# Patient Record
Sex: MALE | Race: WHITE | URBAN - METROPOLITAN AREA
[De-identification: names, ages, dates, MRNs, and addresses within clinical notes are randomized per-mention and may not be internally consistent; named-entity substitution may affect disease eponyms.]

---

## 2017-03-28 ENCOUNTER — TELEPHONE (OUTPATIENT)
Dept: PEDIATRICS | Facility: CLINIC | Age: 42
End: 2017-03-28

## 2017-03-28 NOTE — TELEPHONE ENCOUNTER
3/28/2017     Call Regarding ReattributionPhysical  Attempt 1     Message on voicemail   Comments:         Outreach   CHON

## 2017-04-27 ENCOUNTER — TELEPHONE (OUTPATIENT)
Dept: PEDIATRICS | Facility: CLINIC | Age: 42
End: 2017-04-27

## 2017-04-27 ENCOUNTER — OFFICE VISIT (OUTPATIENT)
Dept: PEDIATRICS | Facility: CLINIC | Age: 42
End: 2017-04-27
Payer: COMMERCIAL

## 2017-04-27 VITALS
TEMPERATURE: 97.4 F | DIASTOLIC BLOOD PRESSURE: 60 MMHG | SYSTOLIC BLOOD PRESSURE: 120 MMHG | HEART RATE: 74 BPM | BODY MASS INDEX: 29.4 KG/M2 | OXYGEN SATURATION: 97 % | HEIGHT: 71 IN | WEIGHT: 210 LBS

## 2017-04-27 DIAGNOSIS — Z00.00 ENCOUNTER FOR ROUTINE ADULT HEALTH EXAMINATION WITHOUT ABNORMAL FINDINGS: Primary | ICD-10-CM

## 2017-04-27 DIAGNOSIS — Z13.1 SCREENING FOR DIABETES MELLITUS: ICD-10-CM

## 2017-04-27 DIAGNOSIS — Z13.6 CARDIOVASCULAR SCREENING; LDL GOAL LESS THAN 160: ICD-10-CM

## 2017-04-27 DIAGNOSIS — R73.01 ELEVATED FASTING GLUCOSE: ICD-10-CM

## 2017-04-27 LAB
ANION GAP SERPL CALCULATED.3IONS-SCNC: 6 MMOL/L (ref 3–14)
BUN SERPL-MCNC: 14 MG/DL (ref 7–30)
CALCIUM SERPL-MCNC: 9 MG/DL (ref 8.5–10.1)
CHLORIDE SERPL-SCNC: 105 MMOL/L (ref 94–109)
CHOLEST SERPL-MCNC: 235 MG/DL
CO2 SERPL-SCNC: 29 MMOL/L (ref 20–32)
CREAT SERPL-MCNC: 1.08 MG/DL (ref 0.66–1.25)
GFR SERPL CREATININE-BSD FRML MDRD: 74 ML/MIN/1.7M2
GLUCOSE SERPL-MCNC: 145 MG/DL (ref 70–99)
HDLC SERPL-MCNC: 41 MG/DL
LDLC SERPL CALC-MCNC: 118 MG/DL
NONHDLC SERPL-MCNC: 194 MG/DL
POTASSIUM SERPL-SCNC: 4.2 MMOL/L (ref 3.4–5.3)
SODIUM SERPL-SCNC: 140 MMOL/L (ref 133–144)
TRIGL SERPL-MCNC: 381 MG/DL

## 2017-04-27 PROCEDURE — 36415 COLL VENOUS BLD VENIPUNCTURE: CPT | Performed by: NURSE PRACTITIONER

## 2017-04-27 PROCEDURE — 80061 LIPID PANEL: CPT | Performed by: NURSE PRACTITIONER

## 2017-04-27 PROCEDURE — 99396 PREV VISIT EST AGE 40-64: CPT | Performed by: NURSE PRACTITIONER

## 2017-04-27 PROCEDURE — 80048 BASIC METABOLIC PNL TOTAL CA: CPT | Performed by: NURSE PRACTITIONER

## 2017-04-27 NOTE — TELEPHONE ENCOUNTER
Called patient, left message with phone number to call back.       Bella Mckeon RN, [facility]

## 2017-04-27 NOTE — NURSING NOTE
"Chief Complaint   Patient presents with     Physical     CARA-not fasting today       Initial /60 (BP Location: Right arm, Patient Position: Chair, Cuff Size: Adult Regular)  Pulse 74  Temp 97.4  F (36.3  C) (Temporal)  Ht 5' 11\" (1.803 m)  Wt 210 lb (95.3 kg)  SpO2 97%  BMI 29.29 kg/m2 Estimated body mass index is 29.29 kg/(m^2) as calculated from the following:    Height as of this encounter: 5' 11\" (1.803 m).    Weight as of this encounter: 210 lb (95.3 kg).  Medication Reconciliation: complete      GUILHERME Hernandez      "

## 2017-04-27 NOTE — PROGRESS NOTES
SUBJECTIVE:     CC: Roger Arcos is an 41 year old male who presents for preventative health visit.     Healthy Habits:    Do you get at least three servings of calcium containing foods daily (dairy, green leafy vegetables, etc.)? yes    Amount of exercise or daily activities, outside of work: 3-4 day(s) per week    Problems taking medications regularly not applicable    Medication side effects: No    Have you had an eye exam in the past two years? yes    Do you see a dentist twice per year? yes    Do you have sleep apnea, excessive snoring or daytime drowsiness?no      Today's PHQ-2 Score:   PHQ-2 ( 1999 [employer]) 4/27/2017 3/6/2015   Q1: Little interest or pleasure in doing things 0 0   Q2: Feeling down, depressed or hopeless 0 0   PHQ-2 Score 0 0       Abuse: Current or Past(Physical, Sexual or Emotional)- No  Do you feel safe in your environment - Yes    Social History   Substance Use Topics     Smoking status: Never Smoker     Smokeless tobacco: Never Used     Alcohol use 1.0 oz/week     2 Standard drinks or equivalent per week      Comment: maybe 3 times a week      The patient does not drink >3 drinks per day nor >7 drinks per week.    Last PSA: No results found for: PSA    Recent Labs   Lab Test  03/06/15   0904   CHOL  217*   HDL  40*   LDL  144*   TRIG  166*   CHOLHDLRATIO  5.4*       Reviewed orders with patient. Reviewed health maintenance and updated orders accordingly - Yes    Reviewed and updated as needed this visit by clinical staff  Tobacco  Allergies  Meds  Med Hx  Surg Hx  Fam Hx  Soc Hx        Reviewed and updated as needed this visit by Provider        Past Medical History:   Diagnosis Date     Pneumonia 1996      Past Surgical History:   Procedure Laterality Date     HC TOOTH EXTRACTION W/FORCEP         ROS:  C: NEGATIVE for fever, chills, change in weight  I: NEGATIVE for worrisome rashes, moles or lesions  E: NEGATIVE for vision changes or irritation  ENT: NEGATIVE for ear, mouth and  throat problems  R: NEGATIVE for significant cough or SOB  CV: NEGATIVE for chest pain, palpitations or peripheral edema  GI: NEGATIVE for nausea, abdominal pain, heartburn, or change in bowel habits   male: negative for dysuria, hematuria, decreased urinary stream, erectile dysfunction, urethral discharge  M: NEGATIVE for significant arthralgias or myalgia  N: NEGATIVE for weakness, dizziness or paresthesias  E: NEGATIVE for temperature intolerance, skin/hair changes  H: NEGATIVE for bleeding problems  P: NEGATIVE for changes in mood or affect    Patient Active Problem List   Diagnosis     CARDIOVASCULAR SCREENING; LDL GOAL LESS THAN 160     Past Surgical History:   Procedure Laterality Date     HC TOOTH EXTRACTION W/FORCEP         Social History   Substance Use Topics     Smoking status: Never Smoker     Smokeless tobacco: Never Used     Alcohol use 1.0 oz/week     2 Standard drinks or equivalent per week      Comment: maybe 3 times a week      Family History   Problem Relation Age of Onset     Hypertension Mother      Hyperlipidemia Mother      Depression/Anxiety Father      Chemical Addiction Father      DIABETES No family hx of      Coronary Artery Disease No family hx of      Breast Cancer No family hx of      Cancer - colorectal No family hx of      Ovarian Cancer No family hx of      Prostate Cancer No family hx of      Mental Illness No family hx of      CEREBROVASCULAR DISEASE No family hx of      Other Cancer No family hx of      Anesthesia Reaction No family hx of      Thyroid Disease No family hx of      Asthma No family hx of      OSTEOPOROSIS No family hx of      Known Genetic Syndrome No family hx of      Obesity No family hx of      Colon Cancer No family hx of      Thyroid Disease No family hx of      Genetic Disorder No family hx of          Current Outpatient Prescriptions   Medication Sig Dispense Refill     ADVIL 100 MG OR TABS 4 TABLETS EVERY 4 TO 6 HOURS AS NEEDED 480 0     Allergies  "  Allergen Reactions     Cats      Pollen Extract      OBJECTIVE:     /60 (BP Location: Right arm, Patient Position: Chair, Cuff Size: Adult Regular)  Pulse 74  Temp 97.4  F (36.3  C) (Temporal)  Ht 5' 11\" (1.803 m)  Wt 210 lb (95.3 kg)  SpO2 97%  BMI 29.29 kg/m2  EXAM:  GENERAL: healthy, alert and no distress  EYES: Eyes grossly normal to inspection, PERRL and conjunctivae and sclerae normal  HENT: ear canals and TM's normal, nose and mouth without ulcers or lesions  NECK: no adenopathy, no asymmetry, masses, or scars and thyroid normal to palpation  RESP: lungs clear to auscultation - no rales, rhonchi or wheezes  CV: regular rate and rhythm, normal S1 S2, no S3 or S4, no murmur, click or rub, no peripheral edema and peripheral pulses strong  ABDOMEN: soft, nontender, no hepatosplenomegaly, no masses and bowel sounds normal   (male): normal male genitalia without lesions or urethral discharge, no hernia  MS: no gross musculoskeletal defects noted, no edema  SKIN: no suspicious lesions or rashes  NEURO: Normal strength and tone, mentation intact and speech normal  PSYCH: mentation appears normal, affect normal/bright  LYMPH: no cervical, supraclavicular, axillary, or inguinal adenopathy    ASSESSMENT/PLAN:     Roger was seen today for physical.    Diagnoses and all orders for this visit:    Encounter for routine adult health examination without abnormal findings  -     Basic metabolic panel  -     Lipid panel reflex to direct LDL    CARDIOVASCULAR SCREENING; LDL GOAL LESS THAN 160  -     Lipid panel reflex to direct LDL    Screening for diabetes mellitus  -     Basic metabolic panel    Elevated fasting glucose  -     **A1C FUTURE anytime; Future    PLAN:   Patient needs to follow up in if no improvement,or sooner if worsening of symptoms or other symptoms develop.  Will follow up and/or notify patient on results via phone or mail to determine further need for followup  Follow up office visit in one " "year for annual health maintenance exam, sooner PRN.    COUNSELING:  Reviewed preventive health counseling, as reflected in patient instructions  Special attention given to:        Regular exercise       Healthy diet/nutrition       Vision screening       The 10-year ASCVD risk score (Bettina TORIBIO Jr, et al., 2013) is: 2.5%    Values used to calculate the score:      Age: 41 years      Sex: Male      Is Non- : No      Diabetic: No      Tobacco smoker: No      Systolic Blood Pressure: 120 mmHg      Is BP treated: No      HDL Cholesterol: 41 mg/dL      Total Cholesterol: 235 mg/dL         reports that he has never smoked. He has never used smokeless tobacco.    Estimated body mass index is 29.29 kg/(m^2) as calculated from the following:    Height as of this encounter: 5' 11\" (1.803 m).    Weight as of this encounter: 210 lb (95.3 kg).   Weight management plan: Discussed healthy diet and exercise guidelines and patient will follow up in 12 months in clinic to re-evaluate.    Counseling Resources:  ATP IV Guidelines  Pooled Cohorts Equation Calculator  FRAX Risk Assessment  ICSI Preventive Guidelines  Dietary Guidelines for Americans, 2010  USDA's MyPlate  ASA Prophylaxis  Lung CA Screening    Akiko Johnson, PAULA CNP  [facility]  "

## 2017-04-27 NOTE — MR AVS SNAPSHOT
After Visit Summary   4/27/2017    Roger Arcos    MRN: [id number]           Patient Information     Date Of Birth          1975        Visit Information        Provider Department      4/27/2017 9:00 AM Akiko Johnson APRN CNP [facility]        Today's Diagnoses     Encounter for routine adult health examination without abnormal findings        CARDIOVASCULAR SCREENING; LDL GOAL LESS THAN 160        Screening for diabetes mellitus          Care Instructions    PLAN:   1.   Increase calcium to 1000mg and 800iu Vit D  2.  Orders Placed This Encounter   Procedures     Basic metabolic panel     Lipid panel reflex to direct LDL     3. Patient needs to follow up in if no improvement,or sooner if worsening of symptoms or other symptoms develop.  Will follow up and/or notify patient on results via phone or mail to determine further need for followup  Follow up office visit in one year for annual health maintenance exam, sooner PRN.    Preventive Health Recommendations  Male Ages 40 to 49    Yearly exam:             See your health care provider every year in order to  o   Review health changes.   o   Discuss preventive care.    o   Review your medicines if your doctor has prescribed any.    You should be tested each year for STDs (sexually transmitted diseases) if you re at risk.     Have a cholesterol test every 5 years.     Have a colonoscopy (test for colon cancer) if someone in your family has had colon cancer or polyps before age 50.     After age 45, have a diabetes test (fasting glucose). If you are at risk for diabetes, you should have this test every 3 years.      Talk with your health care provider about whether or not a prostate cancer screening test (PSA) is right for you.    Shots: Get a flu shot each year. Get a tetanus shot every 10 years.     Nutrition:    Eat at least 5 servings of fruits and vegetables daily.     Eat whole-grain bread, whole-wheat pasta and  brown rice instead of white grains and rice.     Talk to your provider about Calcium and Vitamin D.     Lifestyle    Exercise for at least 150 minutes a week (30 minutes a day, 5 days a week). This will help you control your weight and prevent disease.     Limit alcohol to one drink per day.     No smoking.     Wear sunscreen to prevent skin cancer.     See your dentist every six months for an exam and cleaning.    It was a pleasure seeing you today at the [facility]. Thank you for allowing us to care for you today. We truly hope we provided you with the excellent service you deserve. Please let us know if there is anything else we can do for you so we can be sure you are leaving completley satisfied with your care experience.       General information about your clinic   Clinic Hours Lab Hours (Appointments are required)   Mon-Thurs: 7:30 AM - 7 PM Mon-Thurs: 7:30 AM - 7 PM   Fri: 7:30 AM - 5 PM Fri: 7:30 AM - 5 PM        After Hours Nurse Advise & Appts:  [place] Nurse Advisors: [phone]  [place] On Call: to make appointments anytime: [phone] On Call Physician: call [phone] and answering service will page the on call physician.        For urgent appointments, please call [phone] and ask for the triage nurse or your care team clinic nurse.  How to contact my care team:  MyChart: [email or web address]   Phone: [phone]   Fax: [phone]       [place] [facility]:   Phone: [phone]  Hours: 8:00 AM - 6:00 PM  Medication Refills:  Call your pharmacy and they will forward the refill to us. Please allow 3 business days for your refills to be completed.       Normal or non-critical lab and imaging results will be communicated to you by MyChart, letter or phone within 7 days.  If you do not hear from us within 10 days, please call the clinic. If you have a critical or abnormal lab result, we will notify you by phone as soon as possible.       We now have ALE  "(Pediatric Walk in Care)  Monday-Friday from 7:30-4. Simply walk in and be seen for your urgent needs like cough, fever, rash, diarrhea or vomiting, pink eye, UTI. No appointments needed. Ask one of the team for more information      -Your Care Team:    Dr. Dontrell Brock - Internal Medicine  Dr. Porfirio Bullard - Internal Medicine/Pediatrics   Dr. Johana Persaud - Family Medicine  Dr. Nathalia Thomason - Pediatrics  Dr. Brigid Castro - Pediatrics  Akiko Johnson CNP - Family Practice Nurse Practitioner                         Follow-ups after your visit        Who to contact     If you have questions or need follow up information about today's clinic visit or your schedule please contact [facility] directly at [phone].  Normal or non-critical lab and imaging results will be communicated to you by MyChart, letter or phone within 4 business days after the clinic has received the results. If you do not hear from us within 7 days, please contact the clinic through MyChart or phone. If you have a critical or abnormal lab result, we will notify you by phone as soon as possible.  Submit refill requests through [facility] or call your pharmacy and they will forward the refill request to us. Please allow 3 business days for your refill to be completed.          Additional Information About Your Visit        Care EveryWhere ID     This is your Care EveryWhere ID. This could be used by other organizations to access your [place] medical records  [id number]        Your Vitals Were     Pulse Temperature Height Pulse Oximetry BMI (Body Mass Index)       74 97.4  F (36.3  C) (Temporal) 5' 11\" (1.803 m) 97% 29.29 kg/m2        Blood Pressure from Last 3 Encounters:   04/27/17 120/60   03/06/15 120/85   06/17/05 104/68    Weight from Last 3 Encounters:   04/27/17 210 lb (95.3 kg)   03/06/15 209 lb 1.6 oz (94.8 kg)   06/17/05 196 lb 9.6 oz (89.2 kg)              We Performed the Following     Basic metabolic panel     Lipid " panel reflex to direct LDL        Primary Care Provider Office Phone # Fax #    Akiko Johnson, APRSADE [facility] [phone]       [facility] [address] [facility] [address]  [place] [address]        Thank you!     Thank you for choosing [facility]  for your care. Our goal is always to provide you with excellent care. Hearing back from our patients is one way we can continue to improve our services. Please take a few minutes to complete the written survey that you may receive in the mail after your visit with us. Thank you!             Your Updated Medication List - Protect others around you: Learn how to safely use, store and throw away your medicines at www.disposemymeds.org.          This list is accurate as of: 4/27/17  9:20 AM.  Always use your most recent med list.                   Brand Name Dispense Instructions for use    ADVIL 100 MG Tabs   Generic drug:  ibuprofen     480    4 TABLETS EVERY 4 TO 6 HOURS AS NEEDED

## 2017-04-27 NOTE — TELEPHONE ENCOUNTER
Notes Recorded by Akiko Johnson APRN CNP on 4/27/2017 at 12:40 PM  Please call patient and let him know   -Kidney function (GFR) is normal.  -Sodium is normal.  -Potassium is normal.  -Glucose is elevated and a sign diabetes.  Lets have him come in for a lab check for diabetes an A1C   -LDL(bad) cholesterol level is normal.  -HDL(good) cholesterol level is normal.  -Triglycerides are elevated which can increase your heart disease risk.  A diet high in fat and simple carbohydrates, genetics and being overweight can contribute to this. ADVISE: Exercise, weight control, and omega-3 fatty acids (fish oil) daily are helpful to improve this.  Rechecking your cholesterol in 12 months is recommended (lipid w/ LDL reflex, DX: hypertriglyceridemia - 272.1).

## 2017-04-27 NOTE — LETTER
April 28, 2017      Roger Arcos  [address] [address]              Dear Roger,        Here is a copy of your lab results as we discussed on the phone.  Please give us a call if you have any questions about them.  [phone].      Sincerely,    YAHIR Gonzalez RN [facility]  Akiko Johnson NP

## 2017-04-27 NOTE — PATIENT INSTRUCTIONS
PLAN:   1.   Increase calcium to 1000mg and 800iu Vit D  2.  Orders Placed This Encounter   Procedures     Basic metabolic panel     Lipid panel reflex to direct LDL     3. Patient needs to follow up in if no improvement,or sooner if worsening of symptoms or other symptoms develop.  Will follow up and/or notify patient on results via phone or mail to determine further need for followup  Follow up office visit in one year for annual health maintenance exam, sooner PRN.    Preventive Health Recommendations  Male Ages 40 to 49    Yearly exam:             See your health care provider every year in order to  o   Review health changes.   o   Discuss preventive care.    o   Review your medicines if your doctor has prescribed any.    You should be tested each year for STDs (sexually transmitted diseases) if you re at risk.     Have a cholesterol test every 5 years.     Have a colonoscopy (test for colon cancer) if someone in your family has had colon cancer or polyps before age 50.     After age 45, have a diabetes test (fasting glucose). If you are at risk for diabetes, you should have this test every 3 years.      Talk with your health care provider about whether or not a prostate cancer screening test (PSA) is right for you.    Shots: Get a flu shot each year. Get a tetanus shot every 10 years.     Nutrition:    Eat at least 5 servings of fruits and vegetables daily.     Eat whole-grain bread, whole-wheat pasta and brown rice instead of white grains and rice.     Talk to your provider about Calcium and Vitamin D.     Lifestyle    Exercise for at least 150 minutes a week (30 minutes a day, 5 days a week). This will help you control your weight and prevent disease.     Limit alcohol to one drink per day.     No smoking.     Wear sunscreen to prevent skin cancer.     See your dentist every six months for an exam and cleaning.    It was a pleasure seeing you today at the [facility]. Thank you  for allowing us to care for you today. We truly hope we provided you with the excellent service you deserve. Please let us know if there is anything else we can do for you so we can be sure you are leaving completley satisfied with your care experience.       General information about your clinic   Clinic Hours Lab Hours (Appointments are required)   Mon-Thurs: 7:30 AM - 7 PM Mon-Thurs: 7:30 AM - 7 PM   Fri: 7:30 AM - 5 PM Fri: 7:30 AM - 5 PM        After Hours Nurse Advise & Appts:  [place] Nurse Advisors: [phone]  [place] On Call: to make appointments anytime: [phone] On Call Physician: call [phone] and answering service will page the on call physician.        For urgent appointments, please call [phone] and ask for the triage nurse or your care team clinic nurse.  How to contact my care team:  MyChart: [email or web address]   Phone: [phone]   Fax: [phone]       [place] [facility]:   Phone: [phone]  Hours: 8:00 AM - 6:00 PM  Medication Refills:  Call your pharmacy and they will forward the refill to us. Please allow 3 business days for your refills to be completed.       Normal or non-critical lab and imaging results will be communicated to you by MyChart, letter or phone within 7 days.  If you do not hear from us within 10 days, please call the clinic. If you have a critical or abnormal lab result, we will notify you by phone as soon as possible.       We now have PWIC (Pediatric Walk in Care)  Monday-Friday from 7:30-4. Simply walk in and be seen for your urgent needs like cough, fever, rash, diarrhea or vomiting, pink eye, UTI. No appointments needed. Ask one of the team for more information      -Your Care Team:    Dr. Dontrell Brock - Internal Medicine  Dr. Porfirio Bullard - Internal Medicine/Pediatrics   Dr. Johana Persaud - Family Medicine  Dr. Nathalia Thomason - Pediatrics  Dr. Brigid Castro - Pediatrics  Akiko Johnson CNP - Family Practice Nurse Practitioner

## 2017-04-28 NOTE — TELEPHONE ENCOUNTER
Called patient and gave message per Summer Johnson NP.  Patient verbalized understanding and agreement to plan.   Scheduled lab appt on 5/2/17.    Mailed the results for labs to patient.    Bella Mckeon RN, [facility]

## 2017-05-02 DIAGNOSIS — R73.01 ELEVATED FASTING GLUCOSE: ICD-10-CM

## 2017-05-02 LAB — HBA1C MFR BLD: 5.2 % (ref 4.3–6)

## 2017-05-02 PROCEDURE — 83036 HEMOGLOBIN GLYCOSYLATED A1C: CPT | Performed by: FAMILY MEDICINE

## 2017-05-02 PROCEDURE — 36416 COLLJ CAPILLARY BLOOD SPEC: CPT | Performed by: FAMILY MEDICINE

## 2017-05-03 ENCOUNTER — TELEPHONE (OUTPATIENT)
Dept: PEDIATRICS | Facility: CLINIC | Age: 42
End: 2017-05-03

## 2017-05-03 NOTE — TELEPHONE ENCOUNTER
Notes Recorded by Akiko Johnson APRN CNP on 5/3/2017 at 8:06 AM  Please call  -A1C (diabetic test) is normal and indicates that your blood sugar has been in a normal range the last 3 months.  -Glucose is slight elevated and may be sign of early diabetes (prediabetes). ADVISE:: low carbohydrate diet, exercise, try to lose weight (if necessary) and recheck glucose in 12 months. (GLU,A1C, DX: prediabetes)     Lab Results   Component Value Date    A1C 5.2 05/02/2017       Lab Results   Component Value Date     04/27/2017

## 2017-05-03 NOTE — TELEPHONE ENCOUNTER
Patient given results below.  He states he was not fasting prior to his lab work and had just drank orange juice and had eaten.  Most likely the result of the glucose 145.  Patient verbalized understanding.    Angely Jung RN,   [facility]

## 2017-08-28 ENCOUNTER — OFFICE VISIT (OUTPATIENT)
Dept: FAMILY MEDICINE | Facility: CLINIC | Age: 42
End: 2017-08-28
Payer: COMMERCIAL

## 2017-08-28 VITALS
DIASTOLIC BLOOD PRESSURE: 72 MMHG | HEIGHT: 70 IN | BODY MASS INDEX: 31.11 KG/M2 | OXYGEN SATURATION: 98 % | WEIGHT: 217.3 LBS | TEMPERATURE: 98.2 F | RESPIRATION RATE: 12 BRPM | SYSTOLIC BLOOD PRESSURE: 112 MMHG | HEART RATE: 83 BPM

## 2017-08-28 DIAGNOSIS — R55 SYNCOPE, UNSPECIFIED SYNCOPE TYPE: Primary | ICD-10-CM

## 2017-08-28 PROCEDURE — 99214 OFFICE O/P EST MOD 30 MIN: CPT | Performed by: NURSE PRACTITIONER

## 2017-08-28 NOTE — NURSING NOTE
"Chief Complaint   Patient presents with     ER F/U       Initial /72 (BP Location: Right arm, Patient Position: Sitting, Cuff Size: Adult Regular)  Pulse 83  Temp 98.2  F (36.8  C) (Oral)  Resp 12  Ht 1.778 m (5' 10\")  Wt 98.6 kg (217 lb 4.8 oz)  SpO2 98%  BMI 31.18 kg/m2 Estimated body mass index is 31.18 kg/(m^2) as calculated from the following:    Height as of this encounter: 1.778 m (5' 10\").    Weight as of this encounter: 98.6 kg (217 lb 4.8 oz).  Medication Reconciliation: bernardo Doty        "

## 2017-08-28 NOTE — PROGRESS NOTES
SUBJECTIVE:   Roger Arcos is a 42 year old male who presents to clinic today for the following health issues:      ED/UC Followup:    Facility:  [facility]  Date of visit: 8-25-17  Reason for visit: passed out on flight  Current Status: feeling just fine     On plane ride home from [place]-plane was just starting to taxi down the runway: Felt pins and needles all over his body, then passed out on flight-for about 30 sec according to his wife. Clenched arm up a little also. Was given ice/oxygen. Came to, then passed out again for few seconds and very diaphoretic. Had diarrhea/stool once off plane x 1. Taken to ER.  In ER: CT, heart scanned, labs, EKG, x-ray all negative for stroke, MI, blood clot. In ER for about 7 hours but monitored for about 5. EMT thought it was heart related due to abnormal BP: was quite variable-low and high. Has never had syncope episode before  -on the day he was leaving: had 3 cocktails, raw oysters.   -was going on low sleep, work trip was very busy, up late nights      Since being home felt good over the weekend, tired, slept a lot.  Legs get 'hanh' horse once in a while. Otherwise no medical issues. Does karate 3-5x/week for 40min, lots of stairs at work.     When stressful situations: gets anxious, heart palpitations but not when he is resting. Gets heartburn at times. No SOB or chest pain. Migraine 1-2x/year-blurry vision. No bowel or bladder issues.     FH:  Dad side lung cancer  uncle brain cancer  Mom side: old age deaths      Send info to Summer Johnson NP    Problem list and histories reviewed & adjusted, as indicated.  Additional history: as documented    Patient Active Problem List   Diagnosis     CARDIOVASCULAR SCREENING; LDL GOAL LESS THAN 160     Past Surgical History:   Procedure Laterality Date     HC TOOTH EXTRACTION W/FORCEP         Social History   Substance Use Topics     Smoking status: Never Smoker     Smokeless tobacco: Never Used     Alcohol use 1.0 oz/week     " 2 Standard drinks or equivalent per week      Comment: maybe 3 times a week      Family History   Problem Relation Age of Onset     Hypertension Mother      Hyperlipidemia Mother      Depression/Anxiety Father      Chemical Addiction Father      Pacemaker Maternal Grandfather      has had multiple changes outs     Lung Cancer Paternal Grandmother      smoked a lot     Lung Cancer Paternal Grandfather      smoked a lot     Brain Cancer Paternal Uncle      DIABETES No family hx of      Coronary Artery Disease No family hx of      Breast Cancer No family hx of      Cancer - colorectal No family hx of      Ovarian Cancer No family hx of      Prostate Cancer No family hx of      Mental Illness No family hx of      CEREBROVASCULAR DISEASE No family hx of      Other Cancer No family hx of      Anesthesia Reaction No family hx of      Thyroid Disease No family hx of      Asthma No family hx of      OSTEOPOROSIS No family hx of      Known Genetic Syndrome No family hx of      Obesity No family hx of      Colon Cancer No family hx of      Thyroid Disease No family hx of      Genetic Disorder No family hx of          Current Outpatient Prescriptions   Medication Sig Dispense Refill     ADVIL 100 MG OR TABS 4 TABLETS EVERY 4 TO 6 HOURS AS NEEDED 480 0     Allergies   Allergen Reactions     Cats      Pollen Extract          Reviewed and updated as needed this visit by clinical staffTobacco  Allergies  Meds  Problems  Fam Hx       Reviewed and updated as needed this visit by Provider  Allergies  Meds  Problems  Fam Hx         ROS:  Constitutional, HEENT, cardiovascular, pulmonary, gi and gu systems are negative, except as otherwise noted.      OBJECTIVE:   /72 (BP Location: Right arm, Patient Position: Sitting, Cuff Size: Adult Regular)  Pulse 83  Temp 98.2  F (36.8  C) (Oral)  Resp 12  Ht 1.778 m (5' 10\")  Wt 98.6 kg (217 lb 4.8 oz)  SpO2 98%  BMI 31.18 kg/m2  Body mass index is 31.18 kg/(m^2).  GENERAL: " healthy, alert and no distress  EYES: Eyes grossly normal to inspection, PERRL and conjunctivae and sclerae normal  HENT: ear canals and TM's normal, nose and mouth without ulcers or lesions  NECK: no adenopathy, no asymmetry, masses, or scars and thyroid normal to palpation  RESP: lungs clear to auscultation - no rales, rhonchi or wheezes  CV: regular rate and rhythm, normal S1 S2, no S3 or S4, no murmur, click or rub, no peripheral edema  ABDOMEN: soft, nontender, no hepatosplenomegaly, no masses and bowel sounds normal  MS: no gross musculoskeletal defects noted, no edema  NEURO: Normal strength and tone, mentation intact and speech normal  PSYCH: mentation appears normal, affect normal/bright    Diagnostic Test Results:  none     ASSESSMENT/PLAN:         1. Syncope, unspecified syncope type  Unknown reason for syncope. Possible heart causes: MI, irregular heart rhythm, vasovagal. ER in [place] ruled out MI. Possible neurological cause: seizure? May need to consider EEG if Zio patch negative and/or if occurs again. Patient states all labs/tests negative from ER 2 days ago, not repeating today. He will get results mailed to him so it can get scanned into chart. Will send to PCP also.  - Zio Patch Holter; Future    FUTURE APPOINTMENTS:       - Follow-up office PAULA Sears, NPBurkeC  [facility]

## 2017-08-28 NOTE — MR AVS SNAPSHOT
"              After Visit Summary   2017    Roger Arcos    MRN: [id number]           Patient Information     Date Of Birth          1975        Visit Information        Provider Department      2017 2:40 PM Leonela Nicholson NP [facility]        Today's Diagnoses     Syncope, unspecified syncope type    -  1       Follow-ups after your visit        Future tests that were ordered for you today     Open Future Orders        Priority Expected Expires Ordered    Zio Patch Holter Routine  10/12/2017 2017            Who to contact     If you have questions or need follow up information about today's clinic visit or your schedule please contact [facility] directly at [phone].  Normal or non-critical lab and imaging results will be communicated to you by MyChart, letter or phone within 4 business days after the clinic has received the results. If you do not hear from us within 7 days, please contact the clinic through MyChart or phone. If you have a critical or abnormal lab result, we will notify you by phone as soon as possible.  Submit refill requests through "[employer]" or call your pharmacy and they will forward the refill request to us. Please allow 3 business days for your refill to be completed.          Additional Information About Your Visit        MyChart Information     "[employer]" lets you send messages to your doctor, view your test results, renew your prescriptions, schedule appointments and more. To sign up, go to [email or web address]" . Click on \"Log in\" on the left side of the screen, which will take you to the Welcome page. Then click on \"Sign up Now\" on the right side of the page.     You will be asked to enter the access code listed below, as well as some personal information. Please follow the directions to create your username and password.     Your access code is: [email or web address]  Expires: 2017  3:13 PM     Your access code will  in 90 days. If " "you need help or a new code, please call your [place] clinic or [phone].        Care EveryWhere ID     This is your Care EveryWhere ID. This could be used by other organizations to access your [facility] medical records  [id number]        Your Vitals Were     Pulse Temperature Respirations Height Pulse Oximetry BMI (Body Mass Index)    83 98.2  F (36.8  C) (Oral) 12 1.778 m (5' 10\") 98% 31.18 kg/m2       Blood Pressure from Last 3 Encounters:   08/28/17 112/72   04/27/17 120/60   03/06/15 120/85    Weight from Last 3 Encounters:   08/28/17 98.6 kg (217 lb 4.8 oz)   04/27/17 95.3 kg (210 lb)   03/06/15 94.8 kg (209 lb 1.6 oz)               Primary Care Provider Office Phone # Fax #    Akiko Paul Alex, APRN [facility] [phone]       [address]  [place] [address]        Equal Access to Services     [facility]: Hadii aad ku hadasho Soomaali, waaxda luqadaha, qaybta kaalmada adeegyada, shahida dowd . So [facility] [phone].    ATENCIÓN: Si habla español, tiene a chung disposición servicios gratuitos de asistencia lingüística. Llame al [phone].    We comply with applicable federal civil rights laws and [place] laws. We do not discriminate on the basis of race, color, national origin, age, disability sex, sexual orientation or gender identity.            Thank you!     Thank you for choosing [facility]  for your care. Our goal is always to provide you with excellent care. Hearing back from our patients is one way we can continue to improve our services. Please take a few minutes to complete the written survey that you may receive in the mail after your visit with us. Thank you!             Your Updated Medication List - Protect others around you: Learn how to safely use, store and throw away your medicines at www.disposemymeds.org.          This list is accurate as of: 8/28/17  3:14 PM.  Always use your most recent med list.             "       Brand Name Dispense Instructions for use Diagnosis    ADVIL 100 MG Tabs   Generic drug:  ibuprofen     480    4 TABLETS EVERY 4 TO 6 HOURS AS NEEDED    OTC -PATIENT CHOICE

## 2017-08-29 ENCOUNTER — ALLIED HEALTH/NURSE VISIT (OUTPATIENT)
Dept: CARDIOLOGY | Facility: CLINIC | Age: 42
End: 2017-08-29
Payer: COMMERCIAL

## 2017-08-29 DIAGNOSIS — R55 SYNCOPE, UNSPECIFIED SYNCOPE TYPE: ICD-10-CM

## 2017-08-29 PROCEDURE — 0296T ZIO PATCH HOLTER: CPT

## 2017-08-29 PROCEDURE — 0298T ZZC EXT ECG > 48HR TO 21 DAY REVIEW AND INTERPRETATN: CPT

## 2017-08-29 NOTE — PROGRESS NOTES
Patient has been prescribed a ZioPatch holter for 7 days.  Patient was instructed regarding the indication, function, care and prompt return of the ZioPatch holter monitor. The monitor, with S/N [id number],  was placed on the patient with instructions regarding care of the skin, electrodes, and monitor, as well as documentation in the patient diary. Patient demonstrated understanding of this information and agreed to call [employer] with further questions or concerns.

## 2017-08-29 NOTE — MR AVS SNAPSHOT
MRN:[id number]                      After Visit Summary   2017    Roger Arcos    MRN: [id number]           Visit Information        Provider Department      2017 8:45 AM MG CV TECH; MG DEVICE [facility]        MyChart Information     Microarrayshart is an electronic gateway that provides easy, online access to your medical records. With Microarrayshart, you can request a clinic appointment, read your test results, renew a prescription or communicate with your care team.     To sign up for Micro[employer]hart visit the website at [email or web address]   You will be asked to enter the access code listed below, as well as some personal information. Please follow the directions to create your username and password.     Your access code is: [email or web address]  Expires: 2017  3:13 PM     Your access code will  in 90 days. If you need help or a new code, please contact your [facility] or call [phone] for assistance.        Care EveryWhere ID     This is your Care EveryWhere ID. This could be used by other organizations to access your [place] medical records  [id number]        Equal Access to Services     ERIKA THOMAS : Hadelizabeth Agustin, mikik cornell, qajosseline segovia, shahida dowd . So [facility] [phone].    ATENCIÓN: Si habla español, tiene a chung disposición servicios gratuitos de asistencia lingüística. Nolberto al [phone].    We comply with applicable federal civil rights laws and [place] laws. We do not discriminate on the basis of race, color, national origin, age, disability sex, sexual orientation or gender identity.

## 2017-10-05 ENCOUNTER — OFFICE VISIT (OUTPATIENT)
Dept: PEDIATRICS | Facility: CLINIC | Age: 42
End: 2017-10-05
Payer: COMMERCIAL

## 2017-10-05 VITALS
BODY MASS INDEX: 31.47 KG/M2 | WEIGHT: 219.3 LBS | DIASTOLIC BLOOD PRESSURE: 70 MMHG | SYSTOLIC BLOOD PRESSURE: 120 MMHG | OXYGEN SATURATION: 97 % | TEMPERATURE: 96.3 F | HEART RATE: 77 BPM

## 2017-10-05 DIAGNOSIS — Z23 NEED FOR PROPHYLACTIC VACCINATION AND INOCULATION AGAINST INFLUENZA: ICD-10-CM

## 2017-10-05 DIAGNOSIS — R55 SYNCOPE, UNSPECIFIED SYNCOPE TYPE: Primary | ICD-10-CM

## 2017-10-05 DIAGNOSIS — R74.8 ELEVATED CK: ICD-10-CM

## 2017-10-05 LAB
ANION GAP SERPL CALCULATED.3IONS-SCNC: 6 MMOL/L (ref 3–14)
BASOPHILS # BLD AUTO: 0.1 10E9/L (ref 0–0.2)
BASOPHILS NFR BLD AUTO: 0.5 %
BUN SERPL-MCNC: 19 MG/DL (ref 7–30)
CALCIUM SERPL-MCNC: 8.7 MG/DL (ref 8.5–10.1)
CHLORIDE SERPL-SCNC: 104 MMOL/L (ref 94–109)
CK SERPL-CCNC: 164 U/L (ref 30–300)
CO2 SERPL-SCNC: 28 MMOL/L (ref 20–32)
CREAT SERPL-MCNC: 1.06 MG/DL (ref 0.66–1.25)
DIFFERENTIAL METHOD BLD: NORMAL
EOSINOPHIL # BLD AUTO: 0.2 10E9/L (ref 0–0.7)
EOSINOPHIL NFR BLD AUTO: 1.9 %
ERYTHROCYTE [DISTWIDTH] IN BLOOD BY AUTOMATED COUNT: 13.5 % (ref 10–15)
GFR SERPL CREATININE-BSD FRML MDRD: 76 ML/MIN/1.7M2
GLUCOSE SERPL-MCNC: 84 MG/DL (ref 70–99)
HCT VFR BLD AUTO: 40.9 % (ref 40–53)
HGB BLD-MCNC: 14.8 G/DL (ref 13.3–17.7)
LYMPHOCYTES # BLD AUTO: 3.6 10E9/L (ref 0.8–5.3)
LYMPHOCYTES NFR BLD AUTO: 38 %
MCH RBC QN AUTO: 30.2 PG (ref 26.5–33)
MCHC RBC AUTO-ENTMCNC: 36.2 G/DL (ref 31.5–36.5)
MCV RBC AUTO: 84 FL (ref 78–100)
MONOCYTES # BLD AUTO: 0.9 10E9/L (ref 0–1.3)
MONOCYTES NFR BLD AUTO: 9 %
NEUTROPHILS # BLD AUTO: 4.8 10E9/L (ref 1.6–8.3)
NEUTROPHILS NFR BLD AUTO: 50.6 %
PLATELET # BLD AUTO: 303 10E9/L (ref 150–450)
POTASSIUM SERPL-SCNC: 4 MMOL/L (ref 3.4–5.3)
RBC # BLD AUTO: 4.9 10E12/L (ref 4.4–5.9)
SODIUM SERPL-SCNC: 138 MMOL/L (ref 133–144)
WBC # BLD AUTO: 9.4 10E9/L (ref 4–11)

## 2017-10-05 PROCEDURE — 90686 IIV4 VACC NO PRSV 0.5 ML IM: CPT | Performed by: NURSE PRACTITIONER

## 2017-10-05 PROCEDURE — 80048 BASIC METABOLIC PNL TOTAL CA: CPT | Performed by: NURSE PRACTITIONER

## 2017-10-05 PROCEDURE — 90471 IMMUNIZATION ADMIN: CPT | Performed by: NURSE PRACTITIONER

## 2017-10-05 PROCEDURE — 82550 ASSAY OF CK (CPK): CPT | Performed by: NURSE PRACTITIONER

## 2017-10-05 PROCEDURE — 85025 COMPLETE CBC W/AUTO DIFF WBC: CPT | Performed by: NURSE PRACTITIONER

## 2017-10-05 PROCEDURE — 99214 OFFICE O/P EST MOD 30 MIN: CPT | Mod: 25 | Performed by: NURSE PRACTITIONER

## 2017-10-05 PROCEDURE — 36415 COLL VENOUS BLD VENIPUNCTURE: CPT | Performed by: NURSE PRACTITIONER

## 2017-10-05 NOTE — NURSING NOTE
"Chief Complaint   Patient presents with     Syncope     had one episode last month of fainting       Initial /70 (BP Location: Right arm, Patient Position: Sitting, Cuff Size: Adult Regular)  Pulse 77  Temp 96.3  F (35.7  C) (Temporal)  Wt 219 lb 4.8 oz (99.5 kg)  SpO2 97%  BMI 31.47 kg/m2 Estimated body mass index is 31.47 kg/(m^2) as calculated from the following:    Height as of 8/28/17: 5' 10\" (1.778 m).    Weight as of this encounter: 219 lb 4.8 oz (99.5 kg).  Medication Reconciliation: complete      GUILHERME Hernandez      "

## 2017-10-05 NOTE — MR AVS SNAPSHOT
After Visit Summary   10/5/2017    Roger Arcos    MRN: [id number]           Patient Information     Date Of Birth          1975        Visit Information        Provider Department      10/5/2017 3:50 PM Akiko Johnson APRN CNP [facility]        Today's Diagnoses     Syncope, unspecified syncope type    -  1    Need for prophylactic vaccination and inoculation against influenza        Elevated CK          Care Instructions    PLAN:   1.  Orders Placed This Encounter   Procedures     FLU VAC, SPLIT VIRUS IM > 3 YO (QUADRIVALENT) [95091]     Vaccine Administration, Initial [99046]     CK total     Basic metabolic panel     CBC with platelets differential     Echocardiogram Complete       2. Patient needs to follow up in if no improvement,or sooner if worsening of symptoms or other symptoms develop.  FURTHER TESTING:       - echo   Will follow up and/or notify patient on results via phone or mail to determine further need for followup        It was a pleasure seeing you today at the [facility]. Thank you for allowing us to care for you today. We truly hope we provided you with the excellent service you deserve. Please let us know if there is anything else we can do for you so we can be sure you are leaving completley satisfied with your care experience.       General information about your clinic   Clinic Hours Lab Hours (Appointments are required)   Mon-Thurs: 7:30 AM - 7 PM Mon-Thurs: 7:30 AM - 7 PM   Fri: 7:30 AM - 5 PM Fri: 7:30 AM - 5 PM        After Hours Nurse Advise & Appts:  [facility]: [phone]  [facility] On Call: to make appointments anytime: [phone] On Call Physician: call [phone] and answering service will page the on call physician.        For urgent appointments, please call [phone] and ask for the triage nurse or your care team clinic nurse.  How to contact my care team:  [place]:  [email or web address]   Phone: [phone]   Fax: [phone]       [facility]:   Phone: [phone]  Hours: 8:00 AM - 6:00 PM  Medication Refills:  Call your pharmacy and they will forward the refill to us. Please allow 3 business days for your refills to be completed.       Normal or non-critical lab and imaging results will be communicated to you by MyChart, letter or phone within 7 days.  If you do not hear from us within 10 days, please call the clinic. If you have a critical or abnormal lab result, we will notify you by phone as soon as possible.       We now have PWIC (Pediatric Walk in Care)  Monday-Friday from 7:30-4. Simply walk in and be seen for your urgent needs like cough, fever, rash, diarrhea or vomiting, pink eye, UTI. No appointments needed. Ask one of the team for more information      -Your Care Team:    Dr. Porfirio Bullard - Internal Medicine/Pediatrics   Dr. Johana Persaud - Family Medicine  Dr. Nathalia Thomason - Pediatrics  Akiko Johnson CNP - Family Practice Nurse Practitioner  Dr. Brigid Castro - Pediatrics               Fainting: Vagal Reaction  Fainting (syncope) is a temporary loss of consciousness that is associated with a loss of postural tone. It s also called passing out. It occurs when blood flow to the brain is less than normal. Your healthcare provider believes that your fainting was because of a vagal reaction. This condition is not a sign of serious disease.  A vagal reaction is a response in your body that causes your pulse to slow down or the blood vessels to expand. This causes your blood pressure to fall. And this sends less blood to your brain if you are standing or sitting. That results in dizziness, near-fainting, or fainting. Lying down usually stops the reaction within 60 seconds.  This response can occur during sudden fear, severe pain, emotional stress, overexertion, overheating, hunger, nausea or vomiting, prolonged standing, or standing up after sitting or  lying for a long time.  Home care  Follow these guidelines when caring for yourself at home:    Rest today. Go back to your normal activities as soon as you are feeling back to normal.    Stay hydrated and avoid skipping meals.    If you feel lightheaded or dizzy, lie down right away. Or sit with your head lowered between your knees.  Follow-up care  Follow up with your healthcare provider, or as advised.  When to seek medical advice  Call your healthcare provider right away if any of these occur:    Another fainting spell that s not explained by the common causes listed above    Pain in your chest, arm, neck, jaw, back, or abdomen    Shortness of breath    Severe headache or seizure    Your heart beats very rapidly, very slowly, or irregularly (palpitations)  Date Last Reviewed: 12/1/2016 2000-2017 [employer]. [address] [place] [address]. All rights reserved. This information is not intended as a substitute for professional medical care. Always follow your healthcare professional's instructions.                Follow-ups after your visit        Follow-up notes from your care team     Return for echocardiogram.      Future tests that were ordered for you today     Open Future Orders        Priority Expected Expires Ordered    Echocardiogram Complete Routine  10/5/2018 10/5/2017            Who to contact     If you have questions or need follow up information about today's clinic visit or your schedule please contact [facility] directly at [phone].  Normal or non-critical lab and imaging results will be communicated to you by MyChart, letter or phone within 4 business days after the clinic has received the results. If you do not hear from us within 7 days, please contact the clinic through MyChart or phone. If you have a critical or abnormal lab result, we will notify you by phone as soon as possible.  Submit refill requests through [employer] or call your pharmacy  and they will forward the refill request to us. Please allow 3 business days for your refill to be completed.          Additional Information About Your Visit        Machine Talkerhart Information     pbsi is an electronic gateway that provides easy, online access to your medical records. With pbsi, you can request a clinic appointment, read your test results, renew a prescription or communicate with your care team.     To sign up for pbsi visit the website at [email or web address]   You will be asked to enter the access code listed below, as well as some personal information. Please follow the directions to create your username and password.     Your access code is: [email or web address]  Expires: 2017  3:13 PM     Your access code will  in 90 days. If you need help or a new code, please contact your [facility] or call [phone] for assistance.        Care EveryWhere ID     This is your Care EveryWhere ID. This could be used by other organizations to access your [place] medical records  [id number]        Your Vitals Were     Pulse Temperature Pulse Oximetry BMI (Body Mass Index)          77 96.3  F (35.7  C) (Temporal) 97% 31.47 kg/m2         Blood Pressure from Last 3 Encounters:   10/05/17 120/70   17 112/72   17 120/60    Weight from Last 3 Encounters:   10/05/17 219 lb 4.8 oz (99.5 kg)   17 217 lb 4.8 oz (98.6 kg)   17 210 lb (95.3 kg)              We Performed the Following     Basic metabolic panel     CBC with platelets differential     CK total     FLU VAC, SPLIT VIRUS IM > 3 YO (QUADRIVALENT) [01214]     Vaccine Administration, Initial [00549]        Primary Care Provider Office Phone # Fax #    PAULA Alford [phone]       [address]  [place] [address]        Equal Access to Services     ERIKA THOMAS AH: Yin harris [place], waaxda luqadaha, qaybta kaaljessika segovia, shahida huitron  belinda nancyvimal taigeoffrey reilly. So [facility] [phone].    ATENCIÓN: Si habla farazañol, tiene a chung disposición servicios gratuitos de asistencia lingüística. Nolberto al [phone].    We comply with applicable federal civil rights laws and [place] laws. We do not discriminate on the basis of race, color, national origin, age, disability, sex, sexual orientation, or gender identity.            Thank you!     Thank you for choosing [facility]  for your care. Our goal is always to provide you with excellent care. Hearing back from our patients is one way we can continue to improve our services. Please take a few minutes to complete the written survey that you may receive in the mail after your visit with us. Thank you!             Your Updated Medication List - Protect others around you: Learn how to safely use, store and throw away your medicines at www.disposemymeds.org.          This list is accurate as of: 10/5/17  4:26 PM.  Always use your most recent med list.                   Brand Name Dispense Instructions for use Diagnosis    ADVIL 100 MG Tabs   Generic drug:  ibuprofen     480    4 TABLETS EVERY 4 TO 6 HOURS AS NEEDED    OTC -PATIENT CHOICE

## 2017-10-05 NOTE — PROGRESS NOTES
SUBJECTIVE:   Roger Arcos is a 42 year old male who presents to clinic today for the following health issues:    ED/UC Followup:    Facility:  [facility] ([place], [place])  Date of visit: 8/25/17  Reason for visit: fainting   Current Status: better     Was in [facility] for work conference and having limited sleep. On the way home was sitting on the plane getting ready to take up and then just did not feel well. It was about 3 pm that day and had lunch.   Then was sitting in the seat and the passed out for about 30 second. The went out again and they taxied back to the airport. Was pale and clammy. Fainted while giving him the oxygen. Then went into a big. Had no loss of bowel or bladder. Got off plane and paramedics and went to the bathroom and had to urinate and had a bowel movement. The was transported to ER via ambulance.    Gave him fluids at the ER. Went in for follow up and had a holter done.  Reviewed Holter results which did not show any significant abnormality        Problem list and histories reviewed & adjusted, as indicated.  Additional history: as documented    Patient Active Problem List   Diagnosis     CARDIOVASCULAR SCREENING; LDL GOAL LESS THAN 160     Past Surgical History:   Procedure Laterality Date     HC TOOTH EXTRACTION W/FORCEP         Social History   Substance Use Topics     Smoking status: Never Smoker     Smokeless tobacco: Never Used     Alcohol use 1.0 oz/week     2 Standard drinks or equivalent per week      Comment: maybe 3 times a week      Family History   Problem Relation Age of Onset     Hypertension Mother      Hyperlipidemia Mother      Depression/Anxiety Father      Chemical Addiction Father      Pacemaker Maternal Grandfather      has had multiple changes outs     Lung Cancer Paternal Grandmother      smoked a lot     Lung Cancer Paternal Grandfather      smoked a lot     Brain Cancer Paternal Uncle      DIABETES No family hx of      Coronary Artery  Disease No family hx of      Breast Cancer No family hx of      Cancer - colorectal No family hx of      Ovarian Cancer No family hx of      Prostate Cancer No family hx of      Mental Illness No family hx of      CEREBROVASCULAR DISEASE No family hx of      Other Cancer No family hx of      Anesthesia Reaction No family hx of      Thyroid Disease No family hx of      Asthma No family hx of      OSTEOPOROSIS No family hx of      Known Genetic Syndrome No family hx of      Obesity No family hx of      Colon Cancer No family hx of      Thyroid Disease No family hx of      Genetic Disorder No family hx of          Current Outpatient Prescriptions   Medication Sig Dispense Refill     ADVIL 100 MG OR TABS 4 TABLETS EVERY 4 TO 6 HOURS AS NEEDED 480 0     Allergies   Allergen Reactions     Cats      Pollen Extract      BP Readings from Last 3 Encounters:   10/05/17 120/70   08/28/17 112/72   04/27/17 120/60    Wt Readings from Last 3 Encounters:   10/05/17 219 lb 4.8 oz (99.5 kg)   08/28/17 217 lb 4.8 oz (98.6 kg)   04/27/17 210 lb (95.3 kg)                  Labs reviewed in EPIC        Reviewed and updated as needed this visit by clinical staffTobacco  Allergies  Meds  Med Hx  Surg Hx  Fam Hx  Soc Hx      Reviewed and updated as needed this visit by Provider         ROS:  CONSTITUTIONAL:NEGATIVE for fever, chills, change in weight  ENT/MOUTH: NEGATIVE for ear, mouth and throat problems  RESP:NEGATIVE for significant cough or SOB  CV: NEGATIVE for chest pain, palpitations or peripheral edema  GI: NEGATIVE for nausea, poor appetite and vomiting  MUSCULOSKELETAL: NEGATIVE for significant arthralgias or myalgia  NEURO: NEGATIVE for weakness, dizziness or paresthesias    OBJECTIVE:     /70 (BP Location: Right arm, Patient Position: Sitting, Cuff Size: Adult Regular)  Pulse 77  Temp 96.3  F (35.7  C) (Temporal)  Wt 219 lb 4.8 oz (99.5 kg)  SpO2 97%  BMI 31.47 kg/m2  Body mass index is 31.47 kg/(m^2).  GENERAL  APPEARANCE: alert, active and no distress  RESP: lungs clear to auscultation - no rales, rhonchi or wheezes  CV: regular rates and rhythm and no murmur, click or rub  MS: extremities normal- no gross deformities noted  SKIN: no suspicious lesions or rashes  NEURO: Normal strength and tone, mentation intact and speech normal  PSYCH: mentation appears normal and affect normal/bright  MENTAL STATUS EXAM:  Appearance/Behavior: No apparent distress and Dressed appropriately for weather  Speech: Normal  Mood/Affect: normal affect  Insight: Adequate      Diagnostic Test Results:  Results for orders placed or performed in visit on 10/05/17   CK total   Result Value Ref Range    CK Total 164 30 - 300 U/L   Basic metabolic panel   Result Value Ref Range    Sodium 138 133 - 144 mmol/L    Potassium 4.0 3.4 - 5.3 mmol/L    Chloride 104 94 - 109 mmol/L    Carbon Dioxide 28 20 - 32 mmol/L    Anion Gap 6 3 - 14 mmol/L    Glucose 84 70 - 99 mg/dL    Urea Nitrogen 19 7 - 30 mg/dL    Creatinine 1.06 0.66 - 1.25 mg/dL    GFR Estimate 76 >60 mL/min/1.7m2    GFR Estimate If Black >90 >60 mL/min/1.7m2    Calcium 8.7 8.5 - 10.1 mg/dL   CBC with platelets differential   Result Value Ref Range    WBC 9.4 4.0 - 11.0 10e9/L    RBC Count 4.90 4.4 - 5.9 10e12/L    Hemoglobin 14.8 13.3 - 17.7 g/dL    Hematocrit 40.9 40.0 - 53.0 %    MCV 84 78 - 100 fl    MCH 30.2 26.5 - 33.0 pg    MCHC 36.2 31.5 - 36.5 g/dL    RDW 13.5 10.0 - 15.0 %    Platelet Count 303 150 - 450 10e9/L    Diff Method Automated Method     % Neutrophils 50.6 %    % Lymphocytes 38.0 %    % Monocytes 9.0 %    % Eosinophils 1.9 %    % Basophils 0.5 %    Absolute Neutrophil 4.8 1.6 - 8.3 10e9/L    Absolute Lymphocytes 3.6 0.8 - 5.3 10e9/L    Absolute Monocytes 0.9 0.0 - 1.3 10e9/L    Absolute Eosinophils 0.2 0.0 - 0.7 10e9/L    Absolute Basophils 0.1 0.0 - 0.2 10e9/L       ASSESSMENT/PLAN:     Roger was seen today for syncope.    Diagnoses and all orders for this visit:    Syncope,  unspecified syncope type  -     Basic metabolic panel  -     CBC with platelets differential  -     Echocardiogram Complete; Future    Elevated CK  -     CK total    Need for prophylactic vaccination and inoculation against influenza  -     FLU VAC, SPLIT VIRUS IM > 3 YO (QUADRIVALENT) [85410]  -     Vaccine Administration, Initial [[id number]]    PLAN:   Patient needs to follow up in if no improvement,or sooner if worsening of symptoms or other symptoms develop.  FURTHER TESTING:       - echo   Will follow up and/or notify patient on results via phone or mail to determine further need for followup    See Patient Instructions    PAULA Alford [facility]  Injectable Influenza Immunization Documentation    1.  Is the person to be vaccinated sick today?   No    2. Does the person to be vaccinated have an allergy to a component   of the vaccine?   No    3. Has the person to be vaccinated ever had a serious reaction   to influenza vaccine in the past?   No    4. Has the person to be vaccinated ever had Guillain-Barré syndrome?   No    Form completed by GUILHERME Hernandez

## 2017-10-06 ENCOUNTER — TELEPHONE (OUTPATIENT)
Dept: PEDIATRICS | Facility: CLINIC | Age: 42
End: 2017-10-06

## 2017-10-06 NOTE — TELEPHONE ENCOUNTER
Patient advised lab results normal per Akiko Johnson.  Patient stated understanding and would like a copy of the lab results mailed to his home address.  Labs mailed.    Elizabeth Sims CMA

## 2017-10-06 NOTE — TELEPHONE ENCOUNTER
CK total   Status:  Final result   Visible to patient:  No (Not Released) Dx:  Elevated CK Order: [id number]       Notes Recorded by Akiko Johnson APRN CNP on 10/5/2017 at 9:27 PM  Please let patient know labs are all normal        Ref Range & Units 1d ago       CK Total 30 - 300 U/L 164     Resulting Agency  [facility]          Specimen Collected: 10/05/17  4:31 PM Last Resulted: 10/05/17  5:03 PM                                 Basic metabolic panel   Status:  Final result   Visible to patient:  No (Not Released) Dx:  Syncope, unspecified syncope type Order: [id number]       Notes Recorded by Akiko Johnson APRN CNP on 10/5/2017 at 9:27 PM  Please let patient know labs are all normal           Ref Range & Units 1d ago   5mo ago   2yr ago        Sodium 133 - 144 mmol/L 138 140       Potassium 3.4 - 5.3 mmol/L 4.0 4.2       Chloride 94 - 109 mmol/L 104 105       Carbon Dioxide 20 - 32 mmol/L 28 29       Anion Gap 3 - 14 mmol/L 6 6       Glucose 70 - 99 mg/dL 84 145 (H)CM 99CM     Comments: Non Fasting      Urea Nitrogen 7 - 30 mg/dL 19 14       Creatinine 0.66 - 1.25 mg/dL 1.06 1.08       GFR Estimate >60 mL/min/1.7m2 76 74CM      Comments: Non  GFR Calc      GFR Estimate If Black >60 mL/min/1.7m2 >90 90CM      Comments:  GFR Calc      Calcium 8.5 - 10.1 mg/dL 8.7 9.0      [facility]ing Agency  [facility]          Specimen Collected: 10/05/17  4:31 PM Last Resulted: 10/05/17  5:02 PM                CM=Additional comments                      CBC with platelets differential   Status:  Final result   Visible to patient:  No (Not Released) Dx:  Syncope, unspecified syncope type Order: [id number]       Notes Recorded by Akiko Johnson APRN CNP on 10/5/2017 at 9:27 PM  Please let patient know labs are all normal        Ref Range & Units 1d ago       WBC 4.0 - 11.0 10e9/L 9.4     RBC Count 4.4 - 5.9 10e12/L 4.90     Hemoglobin 13.3 - 17.7 g/dL 14.8     Hematocrit 40.0 - 53.0 % 40.9      MCV 78 - 100 fl 84     MCH 26.5 - 33.0 pg 30.2     MCHC 31.5 - 36.5 g/dL 36.2     RDW 10.0 - 15.0 % 13.5     Platelet Count 150 - 450 10e9/L 303     Diff Method  Automated Method     % Neutrophils % 50.6     % Lymphocytes % 38.0     % Monocytes % 9.0     % Eosinophils % 1.9     % Basophils % 0.5     Absolute Neutrophil 1.6 - 8.3 10e9/L 4.8     Absolute Lymphocytes 0.8 - 5.3 10e9/L 3.6     Absolute Monocytes 0.0 - 1.3 10e9/L 0.9     Absolute Eosinophils 0.0 - 0.7 10e9/L 0.2     Absolute Basophils 0.0 - 0.2 10e9/L 0.1     Resulting Agency  [facility]          Specimen Collected: 10/05/17  4:31 PM Last Resulted: 10/05/17  4:53 PM                      Irais Campos RN

## 2017-10-06 NOTE — LETTER
October 6, 2017      Roger Arcos  [address] [address]        Dear Roger,       Notes Recorded by Akiko Johnson APRN CNP on 10/5/2017 at 9:27 PM  Please let patient know labs are all normal.  Lab results attached.  Please give us a call at # [phone] if you have any questions, or concerns.        Sincerely,        PAULA Alford CNP

## 2017-10-09 ENCOUNTER — RADIANT APPOINTMENT (OUTPATIENT)
Dept: CARDIOLOGY | Facility: CLINIC | Age: 42
End: 2017-10-09
Attending: NURSE PRACTITIONER
Payer: COMMERCIAL

## 2017-10-09 DIAGNOSIS — R55 SYNCOPE, UNSPECIFIED SYNCOPE TYPE: ICD-10-CM

## 2017-10-09 PROCEDURE — 93306 TTE W/DOPPLER COMPLETE: CPT

## 2017-10-10 ENCOUNTER — TELEPHONE (OUTPATIENT)
Dept: PHARMACY | Facility: CLINIC | Age: 42
End: 2017-10-10

## 2017-10-10 NOTE — TELEPHONE ENCOUNTER
Called and spoke with patient relaying information from Summer Johnson CNP. Patient verbalized understanding and has no further questions at this time. Irais Campos RN      Echocardiogram Complete   Status:  Edited Result - FINAL   Visible to patient:  No (Not Released) Dx:  Syncope, unspecified syncope type Order: [id number]       Notes Recorded by Del Johnson APRN CNP on 10/10/2017 at 8:46 AM  Please let patient know echo looks normal and nothing here to cause his episode.       Details        Reading Physician Reading Date Result Priority       Santiago Andujar MD 10/10/2017            Narrative             [id number]  [id number]  [id number]  [id number]^LOS^DEL^RALPH           [facility]  Echocardiography Laboratory  [address] [place] [address]        Name: SOFYA MACKENZIE  MRN: [id number]  : 1975  Study Date: 10/09/2017 03:42 PM  Age: 42 yrs  Gender: Male  Patient Location: [facility]  Reason For Study: Syncope and collapse  Ordering Physician: DEL JOHNSON  Referring Physician: DEL JOHNSON  Performed By: Anaid Thurston RDCS     BSA: 2.2 m2  Height: 70 in  Weight: 219 lb  HR: 80  BP: 128/70 mmHg  _____________________________________________________________________________  __        Procedure  Echocardiogram with two-dimensional, color and spectral Doppler performed.  _____________________________________________________________________________  __        Interpretation Summary     Global and regional left ventricular function is normal with an EF of 60-65%.  Normal left ventricular filling for age. No regional wall motion abnormalities  are seen.  Right ventricular function, chamber size, wall motion, and thickness are  normal.  Both atria appear normal.  Trace mitral insufficiency is present. Trace tricuspid insufficiency is  present.  No pericardial effusion is present.     Previous study not available for  comparison.     _____________________________________________________________________________  __        Left Ventricle  Global and regional left ventricular function is normal with an EF of 60-65%.  Left ventricular wall thickness is normal. Left ventricular size is normal.  Normal left ventricular filling for age. No regional wall motion abnormalities  are seen.     Right Ventricle  Right ventricular function, chamber size, wall motion, and thickness are  normal.     Atria  Both atria appear normal. The atrial septum is intact as assessed by color  Doppler .     Mitral Valve  Mitral leaflet thickness is normal. Trace mitral insufficiency is present.        Aortic Valve  Aortic valve is normal in structure and function. The aortic valve is  tricuspid. No aortic regurgitation is present.     Tricuspid Valve  The tricuspid valve is normal. Trace tricuspid insufficiency is present. The  right ventricular systolic pressure is approximated at 21.8 mmHg plus the  right atrial pressure.     Pulmonic Valve  The pulmonic valve is normal. Trace pulmonic insufficiency is present.     Vessels  The aorta root is normal. The thoracic aorta is normal. The inferior vena cava  cannot be assessed. Ascending aorta 3.1 cm.     Pericardium  No pericardial effusion is present.        Compared to Previous Study  Previous study not available for comparison.     Attestation  I have personally viewed the imaging and agree with the interpretation and  report as documented by the fellow, Tommy Mckoy, and/or edited by me.  _____________________________________________________________________________  __     MMode/2D Measurements & Calculations  IVSd: 0.59 cm  LVIDd: 4.8 cm  LVIDs: 2.2 cm  LVPWd: 0.62 cm  FS: 54.2 %  EDV(Teich): 107.8 ml  ESV(Teich): 16.2 ml  LV mass(C)d: 89.2 grams  LV mass(C)dI: 41.1 grams/m2  Ao root diam: 3.7 cm  LA dimension: 4.2 cm  asc Aorta Diam: 3.1 cm  LA/Ao: 1.1  LVOT diam: 2.2 cm  LVOT area: 3.8 cm2  LA Volume  (BP): 54.0 ml     LA Volume Index (BP): 24.9 ml/m2        Doppler Measurements & Calculations  MV E max timbo: 66.7 cm/sec  MV A max timbo: 61.3 cm/sec  MV E/A: 1.1  MV dec time: 0.18 sec  Ao V2 max: 141.2 cm/sec  Ao max P.0 mmHg  NAHUN(V,D): 3.8 cm2  LV V1 max P.1 mmHg  LV V1 max: 142.5 cm/sec  LV V1 VTI: 24.1 cm  CO(LVOT): 6.4 l/min  CI(LVOT): 2.9 l/min/m2  SV(LVOT): 91.3 ml  SI(LVOT): 42.1 ml/m2  PA V2 max: 162.9 cm/sec  PA max PG: 10.6 mmHg  TR max timbo: 233.5 cm/sec  TR max P.8 mmHg  Pulm Sys Timbo: 76.4 cm/sec  Pulm Abraham Timbo: 47.4 cm/sec  Pulm S/D: 1.6  Lateral E/e': 6.0  Med E to E': 8.6  Medial E/e': 8.6              _____________________________________________________________________________  __        Report approved by: PRATIBHA Rueda 10/10/2017 08:21 AM                 Specimen Collected: 10/09/17  3:42 PM Last Resulted: 10/09/17  3:42 PM

## 2019-09-19 ENCOUNTER — TELEPHONE (OUTPATIENT)
Dept: FAMILY MEDICINE | Facility: CLINIC | Age: 44
End: 2019-09-19

## 2019-09-19 NOTE — TELEPHONE ENCOUNTER
Panel Management Review   One phone call and send letter if unable to reach them or WiOfferhart message and send letter if not read after 2 weeks (You will get a message to your inbasket)      BP Readings from Last 1 Encounters:   10/05/17 120/70        Health Maintenance Due   Topic Date Due     HIV SCREENING  06/13/1988     PREVENTIVE CARE VISIT  04/27/2018     PHQ-2  01/01/2019     INFLUENZA VACCINE (1) 09/01/2019        Fail List measure: BMI        Patient is due/failing the following:   BMI    Action needed:   Patient needs office visit for Preventative Care Visit.    Type of outreach:    Phone, spoke to patient.  spoke to wife who took a message to call us back    Questions for provider review:    None                                                                              .                                            Devorah Mckay

## 2021-03-29 NOTE — PATIENT INSTRUCTIONS
Addended by: MAGDA BORJAS on: 3/29/2021 09:38 AM     Modules accepted: Orders     PLAN:   1.  Orders Placed This Encounter   Procedures     FLU VAC, SPLIT VIRUS IM > 3 YO (QUADRIVALENT) [89101]     Vaccine Administration, Initial [40502]     CK total     Basic metabolic panel     CBC with platelets differential     Echocardiogram Complete       2. Patient needs to follow up in if no improvement,or sooner if worsening of symptoms or other symptoms develop.  FURTHER TESTING:       - echo   Will follow up and/or notify patient on results via phone or mail to determine further need for followup        It was a pleasure seeing you today at the [facility]. Thank you for allowing us to care for you today. We truly hope we provided you with the excellent service you deserve. Please let us know if there is anything else we can do for you so we can be sure you are leaving completley satisfied with your care experience.       General information about your clinic   Clinic Hours Lab Hours (Appointments are required)   Mon-Thurs: 7:30 AM - 7 PM Mon-Thurs: 7:30 AM - 7 PM   Fri: 7:30 AM - 5 PM Fri: 7:30 AM - 5 PM        After Hours Nurse Advise & Appts:  [facility]: [phone]  [facility] On Call: to make appointments anytime: [phone] On Call Physician: call [phone] and answering service will page the on call physician.        For urgent appointments, please call [phone] and ask for the triage nurse or your care team clinic nurse.  How to contact my care team:  MyChart: [email or web address]   Phone: [phone]   Fax: [phone]       [facility]:   Phone: [phone]  Hours: 8:00 AM - 6:00 PM  Medication Refills:  Call your pharmacy and they will forward the refill to us. Please allow 3 business days for your refills to be completed.       Normal or non-critical lab and imaging results will be communicated to you by MyChart, letter or phone within 7 days.  If you do not hear from us within 10 days, please call the clinic. If you have a  critical or abnormal lab result, we will notify you by phone as soon as possible.       We now have PWIC (Pediatric Walk in Care)  Monday-Friday from 7:30-4. Simply walk in and be seen for your urgent needs like cough, fever, rash, diarrhea or vomiting, pink eye, UTI. No appointments needed. Ask one of the team for more information      -Your Care Team:    Dr. Porfirio Bullard - Internal Medicine/Pediatrics   Dr. Johana Persaud - Family Medicine  Dr. Nathalia Thomason - Pediatrics  Akiko Johnson CNP - Family Practice Nurse Practitioner  Dr. Brigid Castro - Pediatrics               Fainting: Vagal Reaction  Fainting (syncope) is a temporary loss of consciousness that is associated with a loss of postural tone. It s also called passing out. It occurs when blood flow to the brain is less than normal. Your healthcare provider believes that your fainting was because of a vagal reaction. This condition is not a sign of serious disease.  A vagal reaction is a response in your body that causes your pulse to slow down or the blood vessels to expand. This causes your blood pressure to fall. And this sends less blood to your brain if you are standing or sitting. That results in dizziness, near-fainting, or fainting. Lying down usually stops the reaction within 60 seconds.  This response can occur during sudden fear, severe pain, emotional stress, overexertion, overheating, hunger, nausea or vomiting, prolonged standing, or standing up after sitting or lying for a long time.  Home care  Follow these guidelines when caring for yourself at home:    Rest today. Go back to your normal activities as soon as you are feeling back to normal.    Stay hydrated and avoid skipping meals.    If you feel lightheaded or dizzy, lie down right away. Or sit with your head lowered between your knees.  Follow-up care  Follow up with your healthcare provider, or as advised.  When to seek medical advice  Call your healthcare provider right away if any of  these occur:    Another fainting spell that s not explained by the common causes listed above    Pain in your chest, arm, neck, jaw, back, or abdomen    Shortness of breath    Severe headache or seizure    Your heart beats very rapidly, very slowly, or irregularly (palpitations)  Date Last Reviewed: 12/1/2016 2000-2017 [employer]. [address] [place] [address]. All rights reserved. This information is not intended as a substitute for professional medical care. Always follow your healthcare professional's instructions.